# Patient Record
Sex: MALE | Race: WHITE | NOT HISPANIC OR LATINO | Employment: UNEMPLOYED | URBAN - METROPOLITAN AREA
[De-identification: names, ages, dates, MRNs, and addresses within clinical notes are randomized per-mention and may not be internally consistent; named-entity substitution may affect disease eponyms.]

---

## 2023-10-16 ENCOUNTER — APPOINTMENT (EMERGENCY)
Dept: RADIOLOGY | Facility: HOSPITAL | Age: 33
End: 2023-10-16
Payer: COMMERCIAL

## 2023-10-16 ENCOUNTER — HOSPITAL ENCOUNTER (EMERGENCY)
Facility: HOSPITAL | Age: 33
Discharge: HOME | End: 2023-10-17
Attending: EMERGENCY MEDICINE | Admitting: EMERGENCY MEDICINE
Payer: COMMERCIAL

## 2023-10-16 ENCOUNTER — HOSPITAL ENCOUNTER (EMERGENCY)
Facility: HOSPITAL | Age: 33
Discharge: HOME | End: 2023-10-16
Attending: EMERGENCY MEDICINE | Admitting: EMERGENCY MEDICINE
Payer: COMMERCIAL

## 2023-10-16 VITALS
OXYGEN SATURATION: 98 % | SYSTOLIC BLOOD PRESSURE: 134 MMHG | DIASTOLIC BLOOD PRESSURE: 69 MMHG | RESPIRATION RATE: 18 BRPM | TEMPERATURE: 97.8 F | HEART RATE: 75 BPM

## 2023-10-16 VITALS
TEMPERATURE: 97.9 F | RESPIRATION RATE: 18 BRPM | SYSTOLIC BLOOD PRESSURE: 147 MMHG | HEART RATE: 75 BPM | DIASTOLIC BLOOD PRESSURE: 80 MMHG | OXYGEN SATURATION: 97 %

## 2023-10-16 DIAGNOSIS — R11.2 NAUSEA AND VOMITING, UNSPECIFIED VOMITING TYPE: ICD-10-CM

## 2023-10-16 DIAGNOSIS — S93.601A SPRAIN OF RIGHT FOOT, INITIAL ENCOUNTER: Primary | ICD-10-CM

## 2023-10-16 DIAGNOSIS — R10.9 ABDOMINAL PAIN: Primary | ICD-10-CM

## 2023-10-16 LAB
ALBUMIN SERPL BCP-MCNC: 3.8 G/DL (ref 3.5–5)
ALP SERPL-CCNC: 56 U/L (ref 34–104)
ALT SERPL W P-5'-P-CCNC: 26 U/L (ref 7–52)
ANION GAP SERPL CALCULATED.3IONS-SCNC: 7 MMOL/L
AST SERPL W P-5'-P-CCNC: 26 U/L (ref 13–39)
BASOPHILS # BLD AUTO: 0.05 THOUSANDS/ÂΜL (ref 0–0.1)
BASOPHILS NFR BLD AUTO: 1 % (ref 0–1)
BILIRUB SERPL-MCNC: 0.27 MG/DL (ref 0.2–1)
BUN SERPL-MCNC: 15 MG/DL (ref 5–25)
CALCIUM SERPL-MCNC: 8.9 MG/DL (ref 8.4–10.2)
CHLORIDE SERPL-SCNC: 109 MMOL/L (ref 96–108)
CO2 SERPL-SCNC: 25 MMOL/L (ref 21–32)
CREAT SERPL-MCNC: 1.11 MG/DL (ref 0.6–1.3)
EOSINOPHIL # BLD AUTO: 0.15 THOUSAND/ÂΜL (ref 0–0.61)
EOSINOPHIL NFR BLD AUTO: 2 % (ref 0–6)
ERYTHROCYTE [DISTWIDTH] IN BLOOD BY AUTOMATED COUNT: 11.8 % (ref 11.6–15.1)
GFR SERPL CREATININE-BSD FRML MDRD: 86 ML/MIN/1.73SQ M
GLUCOSE SERPL-MCNC: 84 MG/DL (ref 65–140)
HCT VFR BLD AUTO: 40.6 % (ref 36.5–49.3)
HGB BLD-MCNC: 14.1 G/DL (ref 12–17)
IMM GRANULOCYTES # BLD AUTO: 0.01 THOUSAND/UL (ref 0–0.2)
IMM GRANULOCYTES NFR BLD AUTO: 0 % (ref 0–2)
LIPASE SERPL-CCNC: 39 U/L (ref 11–82)
LYMPHOCYTES # BLD AUTO: 2.41 THOUSANDS/ÂΜL (ref 0.6–4.47)
LYMPHOCYTES NFR BLD AUTO: 36 % (ref 14–44)
MCH RBC QN AUTO: 30.3 PG (ref 26.8–34.3)
MCHC RBC AUTO-ENTMCNC: 34.7 G/DL (ref 31.4–37.4)
MCV RBC AUTO: 87 FL (ref 82–98)
MONOCYTES # BLD AUTO: 0.46 THOUSAND/ÂΜL (ref 0.17–1.22)
MONOCYTES NFR BLD AUTO: 7 % (ref 4–12)
NEUTROPHILS # BLD AUTO: 3.57 THOUSANDS/ÂΜL (ref 1.85–7.62)
NEUTS SEG NFR BLD AUTO: 54 % (ref 43–75)
NRBC BLD AUTO-RTO: 0 /100 WBCS
PLATELET # BLD AUTO: 232 THOUSANDS/UL (ref 149–390)
PMV BLD AUTO: 9.3 FL (ref 8.9–12.7)
POTASSIUM SERPL-SCNC: 3.8 MMOL/L (ref 3.5–5.3)
PROT SERPL-MCNC: 5.7 G/DL (ref 6.4–8.4)
RBC # BLD AUTO: 4.65 MILLION/UL (ref 3.88–5.62)
SODIUM SERPL-SCNC: 141 MMOL/L (ref 135–147)
WBC # BLD AUTO: 6.65 THOUSAND/UL (ref 4.31–10.16)

## 2023-10-16 PROCEDURE — 73630 X-RAY EXAM OF FOOT: CPT

## 2023-10-16 PROCEDURE — 80053 COMPREHEN METABOLIC PANEL: CPT | Performed by: EMERGENCY MEDICINE

## 2023-10-16 PROCEDURE — 83690 ASSAY OF LIPASE: CPT | Performed by: EMERGENCY MEDICINE

## 2023-10-16 PROCEDURE — 99284 EMERGENCY DEPT VISIT MOD MDM: CPT | Performed by: EMERGENCY MEDICINE

## 2023-10-16 PROCEDURE — 85025 COMPLETE CBC W/AUTO DIFF WBC: CPT | Performed by: EMERGENCY MEDICINE

## 2023-10-16 PROCEDURE — 36415 COLL VENOUS BLD VENIPUNCTURE: CPT | Performed by: EMERGENCY MEDICINE

## 2023-10-16 PROCEDURE — 99284 EMERGENCY DEPT VISIT MOD MDM: CPT

## 2023-10-16 PROCEDURE — 99283 EMERGENCY DEPT VISIT LOW MDM: CPT

## 2023-10-16 RX ORDER — DICYCLOMINE HCL 20 MG
20 TABLET ORAL 2 TIMES DAILY
Qty: 20 TABLET | Refills: 0 | Status: SHIPPED | OUTPATIENT
Start: 2023-10-16

## 2023-10-16 RX ORDER — ONDANSETRON 4 MG/1
4 TABLET, ORALLY DISINTEGRATING ORAL EVERY 6 HOURS PRN
Qty: 12 TABLET | Refills: 0 | Status: SHIPPED | OUTPATIENT
Start: 2023-10-16

## 2023-10-16 NOTE — ED PROVIDER NOTES
Pt Name: Zane Galicia  MRN: [id number]  [address] 1990  Age/Sex: 28 y.o. male  Date of evaluation: 10/16/2023  PCP: No primary care provider on file. CHIEF COMPLAINT    Chief Complaint   Patient presents with    Foot Injury     Pt states he was moving furniture and hurt his R foot, rolling foot over edge of sidewalk    Nausea     Pt states he's "also feeling a little bit of pain and nausea for a few days"         HPI and MDM    28 y.o. male presenting with right foot injury. Patient states yesterday he was moving a dresser, and he was outside on a curb, and his right foot rolled off the curb inwards. He has pain over his right foot. Denies any numbness or tingling or weakness. No history of surgeries in the right foot. Not on any blood thinners. No fevers or chills. He does not report any nausea to me at this time. Differential diagnosis considered includes but not limited to fracture, dislocation, soft tissue injury, ligamentous/tendon injury. Per my independent interpretation of x-ray, no acute fracture or dislocation. Patient offered pain medication but refused. Patient mentions that he is homeless, he was given resources for shelters. He was updated with results. No medical indication for further work-up or hospitalization at this time. Patient discharged in stable condition. Medications - No data to display      Past Medical and Surgical History    Past Medical History:   Diagnosis Date    Hodgkin lymphoma ([address])        History reviewed. No pertinent surgical history. History reviewed. No pertinent family history.     Social History     Tobacco Use    Smoking status: Never    Smokeless tobacco: Never           Allergies    No Known Allergies    Home Medications    Prior to Admission medications    Not on File           Physical Exam      ED Triage Vitals [10/16/23 1728]   Temperature Pulse Respirations Blood Pressure SpO2   97.8 °F (36.6 °C) 75 18 134/69 98 % Temp Source Heart Rate Source Patient Position - Orthostatic VS BP Location FiO2 (%)   Temporal Monitor Sitting Left arm --      Pain Score       --               Physical Exam  Constitutional:       General: He is not in acute distress. HENT:      Head: Normocephalic. Nose: Nose normal.   Eyes:      Conjunctiva/sclera: Conjunctivae normal.   Cardiovascular:      Rate and Rhythm: Normal rate. Pulmonary:      Effort: Pulmonary effort is normal. No respiratory distress. Abdominal:      General: There is no distension. Musculoskeletal:         General: Normal range of motion. Cervical back: Normal range of motion. No rigidity. Comments: Right foot fifth metatarsal bruising and swelling and mild tenderness   Skin:     General: Skin is warm. Findings: No erythema. Neurological:      Mental Status: He is alert and oriented to person, place, and time. Sensory: No sensory deficit. Motor: No weakness. Diagnostic Results      Labs:    Results Reviewed       None            All labs reviewed and utilized in the medical decision making process    Radiology:    XR foot 3+ views RIGHT    (Results Pending)       All radiology studies independently viewed by me and interpreted by the radiologist.    Procedure    Procedures        FINAL IMPRESSION    Final diagnoses:   Sprain of right foot, initial encounter         DISPOSITION    Time reflects when diagnosis was documented in both MDM as applicable and the Disposition within this note       Time User Action Codes Description Comment    10/16/2023  6:36  [address] [place] [address], 5[facility] of right foot, initial encounter           ED Disposition       ED Disposition   Discharge    Condition   Stable    Date/Time   Mon Oct 16, 2023  6:36 PM    Comment   Raffaele Pagan discharge to home/self care.                    Follow-up Information       Follow up With Specialties Details Why Contact Info    [employer]  Call  To establish a family doctor to follow up with [phone]                PATIENT REFERRED TO:    Albert Sheridan  [phone]    Call   To establish a family doctor to follow up with      DISCHARGE MEDICATIONS:    There are no discharge medications for this patient. No discharge procedures on file. Aracely Lou DO        This note was partially completed using voice recognition technology, and was scanned for gross errors; however some errors may still exist. Please contact the author with any questions or requests for clarification.       Aracely Lou DO  10/16/23 6107

## 2023-10-17 NOTE — ED NOTES
This writer spoke with said patient regarding being discharged and needing to depart the department. The pt continued to try and dispute no homeless shelters open or answering the phone. I offered the pt a warm blanket and to allow pt to stay in waiting room until he is able to contact intake for a shelter. A [employer] officer was on premise, this writer asked for his assistance to explain to the pt he is not to call 911 to be brought back to this facility for  homelessness. Pt is currently in waiting room using the phone to call additional shelters.  Security is aware and involved in this situation         Orly Angeles RN  10/16/23 2020

## 2023-10-17 NOTE — ED NOTES
Patient ambulated from the ER waiting room with a steady gait however complained of severe abdominal pain and SOB that is preventing him from ambulating. No tachypnea or retractions observed while the patient ambulated to his assigned bed. Patient then advised the staff who ambulated him to the [facility] that he had to run back to the waiting room to obtain his phone. Patient observed upright, steady gait, independently with no signs of discomfort.       Yair Atkinson RN  10/16/23 [address], RN  10/16/23 0427

## 2023-10-17 NOTE — ED NOTES
This is the second encounter with said patient. Patient is homeless and wants to stay in the department to sleep as he mentioned  " I need to sleep, I have to go to work in the morning", patient was escorted out of department on prior encounter. Patient discharged by provider for this encounter. Patient told his services are complete. Patient began to argue with the provider about not wanting to be discharged. Provider explained thoroughly all aspects of current visit and results from all labs and radiology are WNL, advising the patient that he was cleared to be discharged. This writer requested security to help escort patient out of department as pt continued to argue with provider. When we returned to the johnson bed, patient was arguing with provider about not being treated appropriately where again the provider explained what was done during this current visit. Patient was escorted out of department by security.  [facility] were called as well Iris Julian responded to the department where officer stated patient was already off property      [employer] , RN  10/17/23 0000

## 2023-10-17 NOTE — ED NOTES
Pt becoming increasing restless in department, walking into patients personal space and asking them personal questions. Pt also asking staff for employment. Pt redirected back to his designated johnson bed x3. Charge nurse and security made aware.       Ken Barreto RN  10/16/23 2021
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
## 2023-10-17 NOTE — ED PROVIDER NOTES
Pt Name: Carley Schneider  MRN: [id number]  [address] 1990  Age/Sex: 28 y.o. male  Date of evaluation: 10/16/2023  PCP: No primary care provider on file. CHIEF COMPLAINT    Chief Complaint   Patient presents with    Abdominal Pain     Pt c/o abd pain since this afternoon; pt states pain is sharp, states having one episode of vomiting tonight. Pt seen here earlier today and states pain wasn't addressed. HPI    28 y.o. male presenting with abdominal pain as well as shortness of breath. Patient states he has been having abdominal pain for several hours, also states that he had 1 episode of vomiting prior to arrival, states that he saw some black and red flecks in the vomit he thought might have been blood. The pain is abdomen is through the entire abdomen, intermittent, severe, unchanged with movement position or exertion. He also states he has been feeling short of breath all day long. He denies fever, trauma, chest pain, other symptoms. HPI      Past Medical and Surgical History    Past Medical History:   Diagnosis Date    Hodgkin lymphoma ([address])        History reviewed. No pertinent surgical history. History reviewed. No pertinent family history. Social History     Tobacco Use    Smoking status: Never    Smokeless tobacco: Never           Allergies    No Known Allergies    Home Medications    Prior to Admission medications    Not on File           Review of Systems    Review of Systems   Constitutional:  Negative for appetite change, chills and diaphoresis. HENT:  Negative for drooling, facial swelling, trouble swallowing and voice change. Respiratory:  Positive for shortness of breath. Negative for apnea and wheezing. Cardiovascular:  Negative for chest pain and leg swelling. Gastrointestinal:  Positive for abdominal pain and vomiting. Negative for abdominal distention, diarrhea and nausea. Genitourinary:  Negative for dysuria and urgency.    Musculoskeletal:  Negative for arthralgias, back pain, gait problem and neck pain. Skin:  Negative for color change, rash and wound. Neurological:  Negative for seizures, speech difficulty, weakness and headaches. Psychiatric/Behavioral:  Negative for agitation, behavioral problems and dysphoric mood. The patient is not nervous/anxious. All other systems reviewed and negative. Physical Exam      ED Triage Vitals [10/16/23 2106]   Temperature Pulse Respirations Blood Pressure SpO2   97.9 °F (36.6 °C) 75 18 147/80 97 %      Temp Source Heart Rate Source Patient Position - Orthostatic VS BP Location FiO2 (%)   Temporal Monitor Sitting Left arm --      Pain Score       --               Physical Exam  Vitals and nursing note reviewed. Constitutional:       General: He is not in acute distress. Appearance: He is well-developed. He is not ill-appearing, toxic-appearing or diaphoretic. HENT:      Head: Normocephalic and atraumatic. Right Ear: External ear normal.      Left Ear: External ear normal.      Nose: Nose normal. No congestion or rhinorrhea. Mouth/Throat:      Mouth: Mucous membranes are moist.      Pharynx: Oropharynx is clear. No oropharyngeal exudate or posterior oropharyngeal erythema. Eyes:      Conjunctiva/sclera: Conjunctivae normal.      Pupils: Pupils are equal, round, and reactive to light. Neck:      Trachea: No tracheal deviation. Cardiovascular:      Rate and Rhythm: Normal rate and regular rhythm. Pulses: Normal pulses. Heart sounds: Normal heart sounds. No murmur heard. Pulmonary:      Effort: Pulmonary effort is normal. No respiratory distress. Breath sounds: Normal breath sounds. No stridor. No wheezing or rales. Comments: Patient speaking easily in full sentences without any difficulty, normal respiratory rate, lungs clear to auscultation in all areas, no abnormal breath sounds. Abdominal:      General: There is no distension. Palpations: Abdomen is soft. Tenderness: There is abdominal tenderness. There is no guarding or rebound. Comments: Patient endorsing tenderness to palpation throughout the abdomen, poorly localized, no rebound or guarding. Musculoskeletal:         General: No deformity. Normal range of motion. Cervical back: Normal range of motion and neck supple. No rigidity. Right lower leg: No edema. Left lower leg: No edema. Skin:     General: Skin is warm and dry. Capillary Refill: Capillary refill takes less than 2 seconds. Findings: No rash. Neurological:      Mental Status: He is alert and oriented to person, place, and time. Cranial Nerves: No cranial nerve deficit. Motor: No weakness. Coordination: Coordination normal.      Gait: Gait normal.      Comments: Ambulating with normal steady gait without difficulty or assistance   Psychiatric:         Behavior: Behavior normal.         Thought Content:  Thought content normal.         Judgment: Judgment normal.              Diagnostic Results      Labs:    Results Reviewed       Procedure Component Value Units Date/Time    Comprehensive metabolic panel [[id number]]  (Abnormal) Collected: 10/16/23 2211    Lab Status: Final result Specimen: Blood from Arm, Left Updated: 10/16/23 2237     Sodium 141 mmol/L      Potassium 3.8 mmol/L      Chloride 109 mmol/L      CO2 25 mmol/L      ANION GAP 7 mmol/L      BUN 15 mg/dL      Creatinine 1.11 mg/dL      Glucose 84 mg/dL      Calcium 8.9 mg/dL      AST 26 U/L      ALT 26 U/L      Alkaline Phosphatase 56 U/L      Total Protein 5.7 g/dL      Albumin 3.8 g/dL      Total Bilirubin 0.27 mg/dL      eGFR 86 ml/min/1.73sq m     Narrative:      [facility] guidelines for Chronic Kidney Disease (CKD):     Stage 1 with normal or high GFR (GFR > 90 mL/min/1.73 square meters)    Stage 2 Mild CKD (GFR = 60-89 mL/min/1.73 square meters)    Stage 3A Moderate CKD (GFR = 45-59 mL/min/1.73 square meters) Stage 3B Moderate CKD (GFR = 30-44 mL/min/1.73 square meters)    Stage 4 Severe CKD (GFR = 15-29 mL/min/1.73 square meters)    Stage 5 End Stage CKD (GFR <15 mL/min/1.73 square meters)  Note: GFR calculation is accurate only with a steady state creatinine    Lipase [[id number]]  (Normal) Collected: 10/16/23 2211    Lab Status: Final result Specimen: Blood from Arm, Left Updated: 10/16/23 2237     Lipase 39 u/L     CBC and differential [[id number]] Collected: 10/16/23 2211    Lab Status: Final result Specimen: Blood from Arm, Left Updated: 10/16/23 2217     WBC 6.65 Thousand/uL      RBC 4.65 Million/uL      Hemoglobin 14.1 g/dL      Hematocrit 40.6 %      MCV 87 fL      MCH 30.3 pg      MCHC 34.7 g/dL      RDW 11.8 %      MPV 9.3 fL      Platelets 009 Thousands/uL      nRBC 0 /100 WBCs      Neutrophils Relative 54 %      Immat GRANS % 0 %      Lymphocytes Relative 36 %      Monocytes Relative 7 %      Eosinophils Relative 2 %      Basophils Relative 1 %      Neutrophils Absolute 3.57 Thousands/µL      Immature Grans Absolute 0.01 Thousand/uL      Lymphocytes Absolute 2.41 Thousands/µL      Monocytes Absolute 0.46 Thousand/µL      Eosinophils Absolute 0.15 Thousand/µL      Basophils Absolute 0.05 Thousands/µL             All labs reviewed and utilized in the medical decision making process    Radiology:    No orders to display       All radiology studies independently viewed by me and interpreted by the radiologist.    Procedure    Procedures        ED Course of Care and Re-Assessments      Blood work obtained to help better evaluate abdominal pain and screen for life-threatening illness, returned reassuring. I ordered a chest x-ray  to help evaluate for possible pulmonary pathology or free air under the diaphragm although these are felt to be relatively unlikely based on physical examination.  Patient declined that test, I spoke with the patient about it after discussion of risks and benefits he indicated that he would like the chest x-ray. Unfortunately, despite multiple attempts by x-ray tech to perform the test, patient refused it each time. After overall reassuring work-up, I informed the patient of his results and discharged him. Patient then stated that he had severe abdominal pain that prevented him from walking. We discussed the fact that the patient had been walking several times since checking into the ER. Patient refusing to leave the emergency department despite being discharged, stating that he is unable to walk. Security was summoned, informed patient that he will be escorted out of the emergency department, patient initially request to be wheeled out in a wheelchair, however after that climbed down from the bed, eased himself down to the ground, rolled over onto his back, stuck his tongue out, and then jumped up briskly and walked out with a normal steady gait without difficulty or assistance. Medications - No data to display        FINAL IMPRESSION    Final diagnoses:   Abdominal pain   Nausea and vomiting, unspecified vomiting type         DISPOSITION/PLAN    Presentation as above with complaints of abdominal pain nausea, vomiting, and shortness of breath. Vital signs reassuring, examination likewise reassuring with overall benign abdomen, normal breath sounds, and no objective evidence of impaired breathing. Testing ordered to evaluate complaints as above, patient refused x-ray but did consent to blood work, blood work returned reassuring. Very low suspicion for appendicitis, cholecystitis, sepsis, meningitis, encephalitis, bacterial pneumonia, pneumothorax, small bowel obstruction, brisk or life-threatening GI bleed, other acute life-threatening process. Patient given strict return precautions, referred to primary care for further work-up as needed.   Time reflects when diagnosis was documented in both MDM as applicable and the Disposition within this note       Time User Action Codes Description Comment    10/16/2023 10:48 PM Cindy Estimable T Add [R10.9] Abdominal pain     10/16/2023 10:48 PM Cinyd Estimable T Add [R11.2] Nausea and vomiting, unspecified vomiting type           ED Disposition       ED Disposition   Discharge    Condition   Stable    Date/Time   Mon Oct 16, 2023 10:48 PM    Comment   Wilmerabhijeet Wang discharge to home/self care.                    Follow-up Information       Follow up With Specialties Details Why Contact Info Additional Information    [address] [place] [address] Emergency Department Emergency Medicine Go to  If symptoms worsen [address] [facility] [address] [facility] 2003 [facility] Emergency Department, [place], [place], [address] [facility] [address] [address] [facility] ED Call in 1 day To establish primary care and schedule close follow-up [address] [employer] [address] [facility] [address] [place], [address] [facility] [place], [place], [address] [facility] [address]              PATIENT REFERRED TO:    [address] [place] [address] Emergency Department  [address] [facility] [id number] [phone]  Go to   If symptoms worsen    [address] [facility]  [address]  Call in 1 day  To establish primary care and schedule close follow-up      DISCHARGE MEDICATIONS:    Patient's Medications   Discharge Prescriptions    DICYCLOMINE (BENTYL) 20 MG TABLET    Take 1 tablet (20 mg total) by mouth 2 (two) times a day       Start Date: 10/16/2023End Date: --       Order Dose: 20 mg       Quantity: 20 tablet    Refills: 0    ONDANSETRON (ZOFRAN-ODT) 4 MG DISINTEGRATING TABLET    Take 1 tablet (4 mg total) by mouth every 6 (six) hours as needed for nausea or vomiting       Start Date: 10/16/2023End Date: --       Order Dose: 4 mg       Quantity: 12 tablet    Refills: 0                Marianna Julian MD    Portions of the record may have been created with voice recognition software. Occasional wrong word or "sound alike" substitutions may have occurred due to the inherent limitations of voice recognition software.   Please read the chart carefully and recognize, using context, where substitutions have occurred     Marianna Julian MD  10/16/23 4001